# Patient Record
Sex: MALE | Race: WHITE | Employment: FULL TIME
[De-identification: names, ages, dates, MRNs, and addresses within clinical notes are randomized per-mention and may not be internally consistent; named-entity substitution may affect disease eponyms.]

---

## 2022-03-19 PROBLEM — Z30.09 STERILIZATION CONSULT: Status: ACTIVE | Noted: 2021-06-03

## 2022-03-20 PROBLEM — N20.1 URETEROLITHIASIS: Status: ACTIVE | Noted: 2018-12-03

## 2024-10-18 ENCOUNTER — HOSPITAL ENCOUNTER (OUTPATIENT)
Facility: HOSPITAL | Age: 54
Discharge: HOME | End: 2024-10-21
Payer: OTHER GOVERNMENT

## 2024-10-18 DIAGNOSIS — N20.0 NEPHROLITHIASIS: ICD-10-CM

## 2024-10-18 PROCEDURE — 74018 RADEX ABDOMEN 1 VIEW: CPT

## 2024-10-18 NOTE — RESULT ENCOUNTER NOTE
Right renal nonobstructing urinary stone visible on KUB per radiologist.  Consider ESWL at follow-up.

## 2024-12-18 PROBLEM — Z87.442 HISTORY OF NEPHROLITHIASIS: Status: ACTIVE | Noted: 2024-06-18

## 2024-12-18 PROBLEM — H52.209 ASTIGMATISM: Status: ACTIVE | Noted: 2024-12-18

## 2024-12-18 PROBLEM — M25.539 PAIN IN WRIST: Status: ACTIVE | Noted: 2024-12-18

## 2024-12-18 PROBLEM — M62.838 MUSCLE SPASM: Status: ACTIVE | Noted: 2024-12-18

## 2024-12-18 PROBLEM — S22.39XA CLOSED RIB FRACTURE: Status: ACTIVE | Noted: 2024-12-18

## 2024-12-18 PROBLEM — G44.89 HEADACHE SYNDROME: Status: ACTIVE | Noted: 2024-12-18

## 2024-12-18 PROBLEM — M12.9 ARTHROPATHY: Status: ACTIVE | Noted: 2024-12-18

## 2024-12-18 PROBLEM — F45.8 BRUXISM: Status: ACTIVE | Noted: 2024-12-18

## 2024-12-18 PROBLEM — M75.50 SUBACROMIAL BURSITIS: Status: ACTIVE | Noted: 2024-12-18

## 2024-12-18 PROBLEM — R29.3 ABNORMAL POSTURE: Status: ACTIVE | Noted: 2024-12-18

## 2024-12-18 PROBLEM — N13.2 HYDRONEPHROSIS WITH RENAL AND URETERAL CALCULOUS OBSTRUCTION: Status: ACTIVE | Noted: 2018-12-03

## 2024-12-18 PROBLEM — L65.9 HAIR LOSS DISORDER: Status: ACTIVE | Noted: 2024-10-17

## 2024-12-18 PROBLEM — M71.9 BURSITIS: Status: ACTIVE | Noted: 2024-12-18

## 2024-12-18 PROBLEM — K21.9 GERD (GASTROESOPHAGEAL REFLUX DISEASE): Status: ACTIVE | Noted: 2024-12-18

## 2024-12-18 PROBLEM — M75.120 COMPLETE TEAR OF ROTATOR CUFF: Status: ACTIVE | Noted: 2024-12-18

## 2024-12-18 PROBLEM — M25.519 ARTHRALGIA OF SHOULDER: Status: ACTIVE | Noted: 2024-12-18

## 2024-12-18 PROBLEM — M75.80 ROTATOR CUFF TENDINITIS: Status: ACTIVE | Noted: 2024-12-18

## 2024-12-18 PROBLEM — N40.0 BENIGN PROSTATIC HYPERPLASIA WITHOUT URINARY OBSTRUCTION: Status: ACTIVE | Noted: 2024-06-18

## 2024-12-18 PROBLEM — N52.9 MALE ERECTILE DISORDER: Status: ACTIVE | Noted: 2017-01-20

## 2024-12-18 PROBLEM — G47.9 SLEEP DISTURBANCES: Status: ACTIVE | Noted: 2024-12-18

## 2024-12-18 PROBLEM — G47.00 INSOMNIA: Status: ACTIVE | Noted: 2024-10-17

## 2025-02-07 PROBLEM — J30.9 ALLERGIC RHINITIS: Status: ACTIVE | Noted: 2024-12-31

## 2025-02-07 PROBLEM — F43.10 POSTTRAUMATIC STRESS DISORDER: Status: ACTIVE | Noted: 2024-12-31

## 2025-02-07 PROBLEM — E66.811 CLASS 1 OBESITY: Status: ACTIVE | Noted: 2024-12-19

## 2025-02-07 PROBLEM — G47.33 OBSTRUCTIVE SLEEP APNEA SYNDROME: Status: ACTIVE | Noted: 2024-12-31

## 2025-07-29 ENCOUNTER — HOSPITAL ENCOUNTER (OUTPATIENT)
Age: 55
Discharge: HOME | End: 2025-08-01
Payer: OTHER GOVERNMENT

## 2025-07-29 DIAGNOSIS — N20.0 NEPHROLITHIASIS: ICD-10-CM

## 2025-07-29 PROCEDURE — 74018 RADEX ABDOMEN 1 VIEW: CPT
